# Patient Record
Sex: MALE | URBAN - METROPOLITAN AREA
[De-identification: names, ages, dates, MRNs, and addresses within clinical notes are randomized per-mention and may not be internally consistent; named-entity substitution may affect disease eponyms.]

---

## 2018-06-19 ENCOUNTER — RECORDS - HEALTHEAST (OUTPATIENT)
Dept: LAB | Facility: CLINIC | Age: 33
End: 2018-06-19

## 2018-06-19 LAB — HIV 1+2 AB+HIV1 P24 AG SERPL QL IA: NEGATIVE

## 2018-06-20 LAB
HBV SURFACE AG SERPL QL IA: NEGATIVE
HCV AB SERPL QL IA: NEGATIVE
HEPATITIS B SURFACE ANTIBODY LHE- HISTORICAL: NEGATIVE

## 2019-09-18 ENCOUNTER — THERAPY VISIT (OUTPATIENT)
Dept: PHYSICAL THERAPY | Facility: CLINIC | Age: 34
End: 2019-09-18
Payer: OTHER MISCELLANEOUS

## 2019-09-18 DIAGNOSIS — M54.50 ACUTE LEFT-SIDED LOW BACK PAIN WITHOUT SCIATICA: ICD-10-CM

## 2019-09-18 PROCEDURE — 97161 PT EVAL LOW COMPLEX 20 MIN: CPT | Mod: GP | Performed by: PHYSICAL THERAPIST

## 2019-09-18 PROCEDURE — 97110 THERAPEUTIC EXERCISES: CPT | Mod: GP | Performed by: PHYSICAL THERAPIST

## 2019-09-18 NOTE — PROGRESS NOTES
"[facility] Initial Evaluation  HPI  SUBJECTIVE  Ronny Head is a 34 year old male patient presenting to Physical Therapy with the following Primary Symptoms: Left low back pain. He admits to having related symptoms spreading to the left oblique area, left thoracic paraspinals, and left lumbogluteal junction. These pains are described as constant. He denies having painful cough/sneeze, saddle anaesthesia, severe night pain, peripheral motor deficit, recent bowel/bladder change. Pain is rated 6/10 at rest, and 8/10 at worst. Patient describes pain as aching and pressure.  Patient notes that he felt the left low back muscles are \"swollen\" or appear larger than the muscles on the right. History of symptoms: These pains began suddenly September / 02 / 2019 as the result of an awkward one handed pull and lift. Pain was immediate but was actually worse the next morning upon waking up. Previous treatment has included rest and self-stretching. Patient notes that prior treatment has resulted in no change in symptoms. Since onset, these pains are variable : Current Symptoms are worsened by: bending, lifting, twisting. Current Symptoms are relieved by rest and occasionally stretching. Patient states that current complaints are affecting sleep pattern, 1-2 hours of sleep lost on average. Recent surgical procedure: none. Recent imaging studies have not been performed. General health as reported by patient is: good. Pertinent medical history: none. He denies any significant current illness or recent hospital admissions. He denies any regonal implanted devices. Current occupational status: . 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 Patient has continued working without restrictions until 9/16 when the pain peaked. He has had to take several days off since onset of injury. Since 9/16 he has been working light " duty. Previous functional status: fully functional prior to pain onset/injury.      PATIENT GOALS: Return to working full duty as soon as possible, as well as to receive a program to help self-manage symptoms      OBJECTIVE    STATIC POSTURE: Patient is seated with upright posture, notably avoiding flexion/extension/rotation during subjective interview.  -This patient was not able to remain comfortably seated throughout exam.      MOTOR CONTROL:  -Pelvic tilting: Good, with lumbar paraspinal stretch sensation felt, painfree    ROM:  Flexion Distal shin*   Extension 25% limited, relieves pain   Sidebend left Knees*   Sidebend right Knees   Rotation left WNL*   Rotation right WNL   *Denotes pain        LE NEURO SCREEN    Myotomes:   Left Right   Hip flexion (L2) 4+/5* 4+/5   Knee extension (L3) 5/5 5/5   Ankle dorsiflexion (L4) 5/5 5/5   Great toe extension (L5) 5/5 5/5   Ankle Plantarflexion (S1)         Reflexes: Patellar reflex 2+ bilaterally      SEGMENTAL MOBILITY:  -PA spring test: Patient was Hypomobile through segments L1-L2 through L5-S1. Increasing reproduction of symptoms felt with PA pressure through more distal segments.    PALPATION: Noted hypertrophy of L lumbar paraspinals with occasional fasciculation during exam. Tenderess noted through these paraspinals, at the L PSIS, L QL.      HIP SCREEN:  -ROM: Hypomobile into flexion bilaterally, ERP noted only with overpressure into L hip flexion. IR/ER WNL.      Lumbar Special Tests   Left Right   Ronny test/Modified Ronny test Pos for pain, no limitation NEg   Straight Leg Raise Pos @ 45 dgs Neg   Active Straight Leg Raise Pos @ 45 dgs Neg   Hamstring 90-90 Neg Neg   Piriformis Test Neg Neg   Long Axis Distraction     FADIR Neg Neg   SPIKE Neg Neg   Posterior Capsule Compression Neg Neg   Slump Test Neg Pos for L LBP   NT=Not Tested      REPEATED MOTIONS:  Motion Response (worse/no worse/better)   FIS    FIS x10    EIS Better   EIS x10 Better   RICKY    RICKY  x10    Prone lying No change   Prone on elbows Better   Prone press-up Better   Prone press-up x10 Better       Therapist Assessment: Ronny Head is a 34 year old male patient presenting to Physical Therapy with L LBP consistent with hypertonic muscles and myofascial restrictions limiting function with work tasks. Patient demonstrates loss of lumbar ROM, increased co-activation of spinal extensors, both leading to limitations with work tasks precipitating light duty work at this time.  Skilled PT services are necessary in order to reduce impairments and improve independent function.                      System    Physical Exam    General     ROS    Assessment/Plan:    Patient is a 34 year old male with lumbar complaints.    Patient has the following significant findings with corresponding treatment plan.                Diagnosis 1:  Lumbar strain  Pain -  hot/cold therapy, manual therapy, self management, education and home program  Decreased ROM/flexibility - manual therapy, therapeutic exercise and home program  Decreased joint mobility - manual therapy, therapeutic exercise and home program  Impaired muscle performance - neuro re-education and home program  Decreased function - therapeutic activities and home program    Therapy Evaluation Codes:   Cumulative Therapy Evaluation is: Low complexity.    Previous and current functional limitations:  (See Goal Flow Sheet for this information)    Short term and Long term goals: (See Goal Flow Sheet for this information)     Communication ability:  Patient appears to be able to clearly communicate and understand verbal and written communication and follow directions correctly.  Treatment Explanation - The following has been discussed with the patient:   RX ordered/plan of care  Anticipated outcomes  Possible risks and side effects  This patient would benefit from PT intervention to resume normal activities.   Rehab potential is good.    Frequency:  2 X week, once  daily  Duration:  for 2 weeks tapering to 1 X a week over 4 weeks  Discharge Plan:  Achieve all LTG.  Independent in home treatment program.  Reach maximal therapeutic benefit.    Please refer to the daily flowsheet for treatment today, total treatment time and time spent performing 1:1 timed codes.

## 2019-09-18 NOTE — LETTER
"JIMENEZ MEJIASINE [facility]  [address]  [address]  [place] [address]  [phone]    2019    Re: Ronny Head   :   1985  MRN:  [id number]   REFERRING PHYSICIAN:   Bronson MEDINAM [place] [facility]    Date of Initial Evaluation:   Visits:  Rxs Used: 1  Reason for Referral:  Acute left-sided low back pain without sciatica    EVALUATION SUMMARY    [place] [facility] Initial Evaluation  HPI  SUBJECTIVE  Ronny Head is a 34 year old male patient presenting to Physical Therapy with the following Primary Symptoms: Left low back pain. He admits to having related symptoms spreading to the left oblique area, left thoracic paraspinals, and left lumbogluteal junction. These pains are described as constant. He denies having painful cough/sneeze, saddle anaesthesia, severe night pain, peripheral motor deficit, recent bowel/bladder change. Pain is rated 6/10 at rest, and 8/10 at worst. Patient describes pain as aching and pressure.  Patient notes that he felt the left low back muscles are \"swollen\" or appear larger than the muscles on the right. History of symptoms: These pains began suddenly  as the result of an awkward one handed pull and lift. Pain was immediate but was actually worse the next morning upon waking up. Previous treatment has included rest and self-stretching. Patient notes that prior treatment has resulted in no change in symptoms. Since onset, these pains are variable : Current Symptoms are worsened by: bending, lifting, twisting. Current Symptoms are relieved by rest and occasionally stretching. Patient states that current complaints are affecting sleep pattern, 1-2 hours of sleep lost on average. Recent surgical procedure: none. Recent imaging studies have not been performed. General health as reported by patient is: good. Pertinent medical history: none. He denies any significant current illness or recent hospital admissions. He denies any " regonal implanted devices. Current occupational status: . 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 Patient has continued working without restrictions until  when the pain peaked. He has had to take several days off since onset of injury. Since  he has been working light duty. Previous functional status: fully functional prior to pain onset/injury.      PATIENT GOALS: Return to working full duty as soon as possible, as well as to receive a program to help self-manage symptoms      OBJECTIVE  Ronny Head   :   1985        STATIC POSTURE: Patient is seated with upright posture, notably avoiding flexion/extension/rotation during subjective interview.  -This patient was not able to remain comfortably seated throughout exam.      MOTOR CONTROL:  -Pelvic tilting: Good, with lumbar paraspinal stretch sensation felt, painfree    ROM:  Flexion Distal shin*   Extension 25% limited, relieves pain   Sidebend left Knees*   Sidebend right Knees   Rotation left WNL*   Rotation right WNL   *Denotes pain        LE NEURO SCREEN    Myotomes:   Left Right   Hip flexion (L2) 4+/5* 4+/5   Knee extension (L3) 5/5 5/5   Ankle dorsiflexion (L4) 5/5 5/5   Great toe extension (L5) 5/5 5/5   Ankle Plantarflexion (S1)         Reflexes: Patellar reflex 2+ bilaterally      SEGMENTAL MOBILITY:  -PA spring test: Patient was Hypomobile through segments L1-L2 through L5-S1. Increasing reproduction of symptoms felt with PA pressure through more distal segments.    PALPATION: Noted hypertrophy of L lumbar paraspinals with occasional fasciculation during exam. Tenderess noted through these paraspinals, at the L PSIS, L QL.      HIP SCREEN:  -ROM: Hypomobile into flexion bilaterally, ERP noted only with overpressure into L hip flexion. IR/ER WNL.        Ronny    :   1985      Lumbar Special Tests   Left Right   Ronny  test/Modified Ronny test Pos for pain, no limitation NEg   Straight Leg Raise Pos @ 45 dgs Neg   Active Straight Leg Raise Pos @ 45 dgs Neg   Hamstring 90-90 Neg Neg   Piriformis Test Neg Neg   Long Axis Distraction     FADIR Neg Neg   SPIKE Neg Neg   Posterior Capsule Compression Neg Neg   Slump Test Neg Pos for L LBP   NT=Not Tested      REPEATED MOTIONS:  Motion Response (worse/no worse/better)   FIS    FIS x10    EIS Better   EIS x10 Better   RICKY    RICKY x10    Prone lying No change   Prone on elbows Better   Prone press-up Better   Prone press-up x10 Better       Therapist Assessment: Ronny Head is a 34 year old male patient presenting to Physical Therapy with L LBP consistent with hypertonic muscles and myofascial restrictions limiting function with work tasks. Patient demonstrates loss of lumbar ROM, increased co-activation of spinal extensors, both leading to limitations with work tasks precipitating light duty work at this time.  Skilled PT services are necessary in order to reduce impairments and improve independent function.                      Assessment/Plan:    Patient is a 34 year old male with lumbar complaints.    Patient has the following significant findings with corresponding treatment plan.                Diagnosis 1:  Lumbar strain  Pain -  hot/cold therapy, manual therapy, self management, education and home program  Decreased ROM/flexibility - manual therapy, therapeutic exercise and home program  Decreased joint mobility - manual therapy, therapeutic exercise and home program  Impaired muscle performance - neuro re-education and home program  Decreased function - therapeutic activities and home program  Ronny eHad   :   1985          Previous and current functional limitations:  (See Goal Flow Sheet for this information)    Short term and Long term goals: (See Goal Flow Sheet for this information)     Communication ability:  Patient appears to be able to clearly  communicate and understand verbal and written communication and follow directions correctly.  Treatment Explanation - The following has been discussed with the patient:   RX ordered/plan of care  Anticipated outcomes  Possible risks and side effects  This patient would benefit from PT intervention to resume normal activities.   Rehab potential is good.    Frequency:  2 X week, once daily  Duration:  for 2 weeks tapering to 1 X a week over 4 weeks  Discharge Plan:  Achieve all LTG.  Independent in home treatment program.  Reach maximal therapeutic benefit.          Thank you for your referral.    INQUIRIES  Therapist: Floyd Hines, HELGA DOMINGUEZ [facility]  [address]  [place] [address]  Phone: [phone]  Fax: [phone]

## 2019-09-20 ENCOUNTER — THERAPY VISIT (OUTPATIENT)
Dept: PHYSICAL THERAPY | Facility: CLINIC | Age: 34
End: 2019-09-20
Payer: OTHER MISCELLANEOUS

## 2019-09-20 DIAGNOSIS — M54.50 ACUTE LEFT-SIDED LOW BACK PAIN WITHOUT SCIATICA: ICD-10-CM

## 2019-09-20 PROCEDURE — 97110 THERAPEUTIC EXERCISES: CPT | Mod: GP | Performed by: PHYSICAL THERAPIST

## 2019-09-20 PROCEDURE — 97140 MANUAL THERAPY 1/> REGIONS: CPT | Mod: GP | Performed by: PHYSICAL THERAPIST

## 2019-09-20 PROCEDURE — 97530 THERAPEUTIC ACTIVITIES: CPT | Mod: GP | Performed by: PHYSICAL THERAPIST

## 2019-09-23 ENCOUNTER — THERAPY VISIT (OUTPATIENT)
Dept: PHYSICAL THERAPY | Facility: CLINIC | Age: 34
End: 2019-09-23
Payer: OTHER MISCELLANEOUS

## 2019-09-23 DIAGNOSIS — M54.50 ACUTE LEFT-SIDED LOW BACK PAIN WITHOUT SCIATICA: ICD-10-CM

## 2019-09-23 PROCEDURE — 97110 THERAPEUTIC EXERCISES: CPT | Mod: GP | Performed by: PHYSICAL THERAPIST

## 2019-09-23 PROCEDURE — 97140 MANUAL THERAPY 1/> REGIONS: CPT | Mod: GP | Performed by: PHYSICAL THERAPIST

## 2019-09-25 ENCOUNTER — THERAPY VISIT (OUTPATIENT)
Dept: PHYSICAL THERAPY | Facility: CLINIC | Age: 34
End: 2019-09-25
Payer: OTHER MISCELLANEOUS

## 2019-09-25 DIAGNOSIS — M54.50 ACUTE LEFT-SIDED LOW BACK PAIN WITHOUT SCIATICA: ICD-10-CM

## 2019-09-25 PROCEDURE — 97140 MANUAL THERAPY 1/> REGIONS: CPT | Mod: GP | Performed by: PHYSICAL THERAPIST

## 2019-09-25 PROCEDURE — 97110 THERAPEUTIC EXERCISES: CPT | Mod: GP | Performed by: PHYSICAL THERAPIST

## 2019-09-30 ENCOUNTER — THERAPY VISIT (OUTPATIENT)
Dept: PHYSICAL THERAPY | Facility: CLINIC | Age: 34
End: 2019-09-30
Payer: OTHER MISCELLANEOUS

## 2019-09-30 DIAGNOSIS — M54.50 ACUTE LEFT-SIDED LOW BACK PAIN WITHOUT SCIATICA: ICD-10-CM

## 2019-09-30 PROCEDURE — 97110 THERAPEUTIC EXERCISES: CPT | Mod: GP | Performed by: PHYSICAL THERAPIST

## 2019-10-10 ENCOUNTER — THERAPY VISIT (OUTPATIENT)
Dept: PHYSICAL THERAPY | Facility: CLINIC | Age: 34
End: 2019-10-10
Payer: OTHER MISCELLANEOUS

## 2019-10-10 DIAGNOSIS — M54.50 ACUTE LEFT-SIDED LOW BACK PAIN WITHOUT SCIATICA: ICD-10-CM

## 2019-10-10 PROCEDURE — 97110 THERAPEUTIC EXERCISES: CPT | Mod: GP | Performed by: PHYSICAL THERAPIST

## 2019-10-14 ENCOUNTER — THERAPY VISIT (OUTPATIENT)
Dept: PHYSICAL THERAPY | Facility: CLINIC | Age: 34
End: 2019-10-14
Payer: OTHER MISCELLANEOUS

## 2019-10-14 DIAGNOSIS — M54.50 ACUTE LEFT-SIDED LOW BACK PAIN WITHOUT SCIATICA: ICD-10-CM

## 2019-10-14 PROCEDURE — 97110 THERAPEUTIC EXERCISES: CPT | Mod: GP | Performed by: PHYSICAL THERAPIST

## 2019-10-14 NOTE — PROGRESS NOTES
HPI                 System    Physical Exam    General     ROS    Assessment/Plan:    PROGRESS  REPORT    Progress reporting period is from 9/18/19 to 10/14/19.       SUBJECTIVE  Subjective: Patient has returned to the gym with light lifting exercises, which is tolerable. Most lifting exercises >25# bother it, but he feels that the load tolerance has improved overall since beginning PT. He has modified his driving and sitting postures, which has actually helped quite a bit over the past weekend. He has continued to be dismissed from work activities. Overall, things are improving, and patient believes that he is 60-70% normal. He sees the occupational health MD tomorrow and is planning to request more PT visits.      Current Pain level: 0/10 at rest, 5/10 baseline with active exercise.     Initial Pain level: 8/10 with exercise, 6/10 at rest.     Changes in function:  Yes, improved load tolerance to lifting activity, improved lumbar ROM, improved body mechanics with neutral spine exercise, improved self-management strategies.  Adverse reaction to treatment or activity: activity - heavy lifting, lifting with rotation especially to the left.    OBJECTIVE  Changes noted in objective findings:  Yes, improved lumbar ROM in sagittal plane, improved load tolerance with lifting activities simulating work activity, improved body mechanics with abdominal exercises, improved self-management of sleeping and driving postures, improved fear avoidance of activity.    Objective: Pre-session: Flexion to floor with ERP at ankle level, extension full with slight ERP, rotation full with discomfort at end range L rotation.  Post-Session: Flexion to floor with no change in end range pain at ankle level, rotation and extension ROM unchanged with no change in ERP. Body mechanics throughout all neutral spine exercise for isometric rotation with good form. Some discomfort from increased loading today felt after session with no effect on  gait or ADL-level function immediately following session.    ASSESSMENT/PLAN  STG/LTGs have been met or progress has been made towards goals:  Yes, lifting load tolerance has progressed from 10# x10 with 8/10 pain to 20# x10 today with 6/10 pain in fully fatigued state, with improved body mechanics and self-correction of form.  Assessment of Progress: The patient's condition is improving.  The patient's condition has potential to improve.  Self Management Plans:  Patient has been instructed in a home treatment program.  I have re-evaluated this patient and find that the nature, scope, duration and intensity of the therapy is appropriate for the medical condition of the patient.  Ronny continues to require the following intervention to meet STG and LTG's:  PT    Recommendations:  This patient would benefit from continued therapy to continue with progress toward short term goals and long term goals in order to return to work painfree and without restrictions.     Frequency:  1 X week, once daily  Duration:  for 5 weeks        Please refer to the daily flowsheet for treatment today, total treatment time and time spent performing 1:1 timed codes.

## 2019-10-14 NOTE — LETTER
JIMENEZ DOMINGUEZ [facility]  [address]  [address]  [place] [address]  [phone]    2019    Re: Ronny Head   :   1985  MRN:  [id number]   REFERRING PHYSICIAN:   Bronson Landers    JIMENEZ DOMINGUEZ [facility]    Date of Initial Evaluation: 19  Visits:  Rxs Used: 7  Reason for Referral:  Acute left-sided low back pain without sciatica      PROGRESS  REPORT    Progress reporting period is from 19 to 10/14/19.       SUBJECTIVE  Subjective: Patient has returned to the gym with light lifting exercises, which is tolerable. Most lifting exercises >25# bother it, but he feels that the load tolerance has improved overall since beginning PT. He has modified his driving and sitting postures, which has actually helped quite a bit over the past weekend. He has continued to be dismissed from work activities. Overall, things are improving, and patient believes that he is 60-70% normal. He sees the occupational health MD tomorrow and is planning to request more PT visits.      Current Pain level: 0/10 at rest, 5/10 baseline with active exercise.     Initial Pain level: 8/10 with exercise, 6/10 at rest.     Changes in function:  Yes, improved load tolerance to lifting activity, improved lumbar ROM, improved body mechanics with neutral spine exercise, improved self-management strategies.  Adverse reaction to treatment or activity: activity - heavy lifting, lifting with rotation especially to the left.    OBJECTIVE  Changes noted in objective findings:  Yes, improved lumbar ROM in sagittal plane, improved load tolerance with lifting activities simulating work activity, improved body mechanics with abdominal exercises, improved self-management of sleeping and driving postures, improved fear avoidance of activity.    Objective: Pre-session: Flexion to floor with ERP at ankle level, extension full with slight ERP, rotation full with discomfort at end range L rotation.  Post-Session: Flexion to floor with no  change in end range pain at ankle level, rotation and extension ROM unchanged with no change in ERP. Body mechanics throughout all neutral spine exercise for isometric rotation with good form. Some discomfort from increased loading today felt after session with no effect on gait or ADL-level function immediately following session.    Ronny Head   :   1985        ASSESSMENT/PLAN  STG/LTGs have been met or progress has been made towards goals:  Yes, lifting load tolerance has progressed from 10# x10 with 8/10 pain to 20# x10 today with 6/10 pain in fully fatigued state, with improved body mechanics and self-correction of form.  Assessment of Progress: The patient's condition is improving.  The patient's condition has potential to improve.  Self Management Plans:  Patient has been instructed in a home treatment program.  I have re-evaluated this patient and find that the nature, scope, duration and intensity of the therapy is appropriate for the medical condition of the patient.  Ronny continues to require the following intervention to meet STG and LTG's:  PT    Recommendations:  This patient would benefit from continued therapy to continue with progress toward short term goals and long term goals in order to return to work painfree and without restrictions.     Frequency:  1 X week, once daily  Duration:  for 5 weeks                    Thank you for your referral.    INQUIRIES  Therapist: Floyd Hines, PT  JIMENEZ DOMINGUEZ [facility]  [address]  [place] [address]  Phone: [phone]  Fax: [phone]

## 2019-10-16 ENCOUNTER — THERAPY VISIT (OUTPATIENT)
Dept: PHYSICAL THERAPY | Facility: CLINIC | Age: 34
End: 2019-10-16
Payer: OTHER MISCELLANEOUS

## 2019-10-16 DIAGNOSIS — M54.50 ACUTE LEFT-SIDED LOW BACK PAIN WITHOUT SCIATICA: ICD-10-CM

## 2019-10-16 PROCEDURE — 97110 THERAPEUTIC EXERCISES: CPT | Mod: GP | Performed by: PHYSICAL THERAPIST

## 2019-10-16 PROCEDURE — 97530 THERAPEUTIC ACTIVITIES: CPT | Mod: GP | Performed by: PHYSICAL THERAPIST

## 2019-10-21 ENCOUNTER — THERAPY VISIT (OUTPATIENT)
Dept: PHYSICAL THERAPY | Facility: CLINIC | Age: 34
End: 2019-10-21
Payer: OTHER MISCELLANEOUS

## 2019-10-21 DIAGNOSIS — M54.50 ACUTE LEFT-SIDED LOW BACK PAIN WITHOUT SCIATICA: ICD-10-CM

## 2019-10-21 PROCEDURE — 97110 THERAPEUTIC EXERCISES: CPT | Mod: GP | Performed by: PHYSICAL THERAPIST

## 2019-10-23 ENCOUNTER — THERAPY VISIT (OUTPATIENT)
Dept: PHYSICAL THERAPY | Facility: CLINIC | Age: 34
End: 2019-10-23
Payer: OTHER MISCELLANEOUS

## 2019-10-23 DIAGNOSIS — M54.50 ACUTE LEFT-SIDED LOW BACK PAIN WITHOUT SCIATICA: ICD-10-CM

## 2019-10-23 PROCEDURE — 97012 MECHANICAL TRACTION THERAPY: CPT | Mod: GP | Performed by: PHYSICAL THERAPIST

## 2019-10-23 PROCEDURE — 97110 THERAPEUTIC EXERCISES: CPT | Mod: GP | Performed by: PHYSICAL THERAPIST

## 2019-10-28 ENCOUNTER — THERAPY VISIT (OUTPATIENT)
Dept: PHYSICAL THERAPY | Facility: CLINIC | Age: 34
End: 2019-10-28
Payer: OTHER MISCELLANEOUS

## 2019-10-28 DIAGNOSIS — M54.50 ACUTE LEFT-SIDED LOW BACK PAIN WITHOUT SCIATICA: ICD-10-CM

## 2019-10-28 PROCEDURE — 97530 THERAPEUTIC ACTIVITIES: CPT | Mod: GP | Performed by: PHYSICAL THERAPIST

## 2019-10-28 PROCEDURE — 97110 THERAPEUTIC EXERCISES: CPT | Mod: GP | Performed by: PHYSICAL THERAPIST

## 2019-10-28 PROCEDURE — 97012 MECHANICAL TRACTION THERAPY: CPT | Mod: GP | Performed by: PHYSICAL THERAPIST

## 2019-12-23 PROBLEM — M54.50 ACUTE LEFT-SIDED LOW BACK PAIN WITHOUT SCIATICA: Status: RESOLVED | Noted: 2019-09-18 | Resolved: 2019-12-23

## 2019-12-23 NOTE — PROGRESS NOTES
12/23/19:    Patient has failed to follow up with clinic within 30 days without communication. Current functional status is unknown at this time. Please see most recent note dated 10/28/19 for most recent functional status. Patient is to be discharged from formal skilled PT at this time.

## 2020-06-29 ENCOUNTER — THERAPY VISIT (OUTPATIENT)
Dept: PHYSICAL THERAPY | Facility: CLINIC | Age: 35
End: 2020-06-29
Payer: OTHER MISCELLANEOUS

## 2020-06-29 DIAGNOSIS — G89.29 CHRONIC BILATERAL LOW BACK PAIN WITH LEFT-SIDED SCIATICA: Primary | ICD-10-CM

## 2020-06-29 DIAGNOSIS — M54.42 CHRONIC BILATERAL LOW BACK PAIN WITH LEFT-SIDED SCIATICA: Primary | ICD-10-CM

## 2020-06-29 PROCEDURE — 97161 PT EVAL LOW COMPLEX 20 MIN: CPT | Mod: GP | Performed by: PHYSICAL THERAPIST

## 2020-06-29 PROCEDURE — 97110 THERAPEUTIC EXERCISES: CPT | Mod: GP | Performed by: PHYSICAL THERAPIST

## 2020-06-29 PROCEDURE — 97012 MECHANICAL TRACTION THERAPY: CPT | Mod: GP | Performed by: PHYSICAL THERAPIST

## 2020-06-29 NOTE — LETTER
JIMENEZ MEJIASINE [facility]  [address]  [place] [address]  [phone]    July 3, 2020    Re: Ronny Head   :   1985  MRN:  [id number]   REFERRING PHYSICIAN:   Scott DOMINGUEZ [facility]    Date of Initial Evaluation:  2020  Visits:  Rxs Used: 1  Reason for Referral:  Chronic bilateral low back pain with left-sided sciatica    EVALUATION SUMMARY    [facility] Initial Evaluation  Subjective:  The history is provided by the patient. No  was used.   Therapist Generated HPI Evaluation  Problem details: 10 months post low back injury at work while lifting. Low back and referred sx's to the L foot     Cortisone injection in 2020 without relief.     Radial Nerve Block in May 2020, offered pain relief    Has not RTW for [employer], but does work as a  at gym.     Takes oral Oxycodone, Tramidol, and Lyrica to assist with sleeping.     RAMYA set for evaluation    Patient goal is restore 100% pre-injury status with weight lifting and self care/life activity.     He has had multiple PT, chiro care.   .         Type of problem:  Lumbar.    This is a chronic condition.  Condition occurred with:  Lifting.  Where condition occurred: at work.  Patient reports pain:  Lumbar spine right.  Pain is described as aching and is intermittent (5/10 ).  Pain radiates to:  Thigh left, lower leg left and foot left. Pain is worse during the night.  Since onset symptoms are unchanged.  Associated symptoms:  Loss of motion, loss of strength and tingling. Symptoms are exacerbated by bending, carrying, lifting, sitting, standing and twisting  Relieved by: injection therapy and nerve block.   Special tests included:  MRI.  Previous treatment includes physical therapy. There was mild improvement following   Ronny Head   :   1985    previous treatment.  Restrictions due to condition include:  Currently not working due to present treatment.  Barriers  include:  None as reported by patient.    Oswestry Score: 22 %                 Objective:  Standing Alignment:    Cervical/Thoracic:  Forward head  Shoulder/UE:  Rounded shoulders  Lumbar:  Normal    Gait:    Gait Type:  Normal   Weight Bearing Status:  WBAT   Assistive Devices:  None  Neurological: He is alert. He has normal strength and normal reflexes. No cranial nerve deficit or sensory deficit.   Physical Exam      [facility] Lumbar Evaluation    Posture:  Sitting: fair  Standing: good  Lordosis: Reduced  Lateral Shift: no  Correction of Posture: better    Movement Loss:  Flexion (Flex): min and pain  Extension (EXT): mod and pain  Side Mozier R (SG R): nil  Side Glide L (SG L): nil    Test Movements:  FIS: During: produces and no effect  After: no effect  Mechanical Response: no effect  Repeat FIS: During: produces  After: no effect  Mechanical Response: no effect  EIS: During: decreases  After: no better  Mechanical Response: IncROM  Repeat EIS: During: decreases  After: no better  Mechanical Response: IncROM    Static Tests:  Sitting Slouched: increase, worse sx's   Sitting Erect: better , no abolished   Lying Prone in Extension: no effect , press up position reduces     Conclusion: derangement  Principle of Treatment:  Posture Correction: modified posture education   Extension: initiate progressive extension  Other: use of modalities such as mechanical traction for pain relief up to 2-3 visit  VICTORIA Head   :   1985    Assessment/Plan:    Patient is a 35 year old male with lumbar complaints.    Patient has the following significant findings with corresponding treatment plan.                Diagnosis 1:  Mechanical Low Back pain       Therapy Evaluation Codes:   1) History comprised of:   Personal factors that impact the plan of care:      None.    Comorbidity factors that impact the plan of care are:      None.     Medications impacting care: Pain, Sleep and Narcs.  2) Examination of Body  Systems comprised of:   Body structures and functions that impact the plan of care:      Lumbar spine.   Activity limitations that impact the plan of care are:      Bending, Driving, Dressing, Lifting, Squatting/kneeling, Working and Sleeping.  3) Clinical presentation characteristics are:   Stable/Uncomplicated.  4) Decision-Making    Low complexity using standardized patient assessment instrument and/or measureable assessment of functional outcome.  Cumulative Therapy Evaluation is: Low complexity.  Previous and current functional limitations:  (See Goal Flow Sheet for this information)    Short term and Long term goals: (See Goal Flow Sheet for this information)   Communication ability:  Patient appears to be able to clearly communicate and understand verbal and written communication and follow directions correctly.  Treatment Explanation - The following has been discussed with the patient:   RX ordered/plan of care  Anticipated outcomes  Possible risks and side effects  This patient would benefit from PT intervention to resume normal activities.   Rehab potential is fair.  Frequency:  1-2 X week, once daily  Duration:  for 4 weeks  Discharge Plan:  Achieve all LTG.  Independent in home treatment program.  Reach maximal therapeutic benefit.  Rehab Plans: modalities prn, advance lumbar extension principles and possible manual therapy     Thank you for your referral.    INQUIRIES  Therapist: Gustavo Guo, PT, ATC, Cert MDT  JIMENEZ DOMINGUEZ [facility] [place] [facility]  [address]  [place] [address]  Phone: [phone]  Fax: [phone]

## 2020-07-03 NOTE — PROGRESS NOTES
[facility] Initial Evaluation  Subjective:  The history is provided by the patient. No  was used.   Therapist Generated HPI Evaluation  Problem details: 10 months post low back injury at work while lifting. Low back and referred sx's to the L foot     Cortisone injection in February 2020 without relief.     Radial Nerve Block in May 2020, offered pain relief    Has not RTW for [employer], but does work as a  at gym.     Takes oral Oxycodone, Tramidol, and Lyrica to assist with sleeping.     RAMYA set for evaluation    Patient goal is restore 100% pre-injury status with weight lifting and self care/life activity.     He has had multiple PT, chiro care.       .         Type of problem:  Lumbar.    This is a chronic condition.  Condition occurred with:  Lifting.  Where condition occurred: at work.  Patient reports pain:  Lumbar spine right.  Pain is described as aching and is intermittent (5/10 ).  Pain radiates to:  Thigh left, lower leg left and foot left. Pain is worse during the night.  Since onset symptoms are unchanged.  Associated symptoms:  Loss of motion, loss of strength and tingling. Symptoms are exacerbated by bending, carrying, lifting, sitting, standing and twisting  Relieved by: injection therapy and nerve block.   Special tests included:  MRI.  Previous treatment includes physical therapy. There was mild improvement following previous treatment.  Restrictions due to condition include:  Currently not working due to present treatment.  Barriers include:  None as reported by patient.      Oswestry Score: 22 %                 Objective:  Standing Alignment:    Cervical/Thoracic:  Forward head  Shoulder/UE:  Rounded shoulders  Lumbar:  Normal            Gait:    Gait Type:  Normal   Weight Bearing Status:  WBAT   Assistive Devices:  None        Neurological: He is alert. He has normal strength and normal reflexes. No cranial nerve deficit or sensory  deficit.       Physical Exam      [facility] Lumbar Evaluation    Posture:  Sitting: fair  Standing: good  Lordosis: Reduced  Lateral Shift: no  Correction of Posture: better    Movement Loss:  Flexion (Flex): min and pain  Extension (EXT): mod and pain  Side Byram R (SG R): nil  Side Glide L (SG L): nil  Test Movements:  FIS: During: produces and no effect  After: no effect  Mechanical Response: no effect  Repeat FIS: During: produces  After: no effect  Mechanical Response: no effect  EIS: During: decreases  After: no better  Mechanical Response: IncROM  Repeat EIS: During: decreases  After: no better  Mechanical Response: IncROM          Static Tests:  Sitting Slouched: increase, worse sx's   Sitting Erect: better , no abolished       Lying Prone in Extension: no effect , press up position reduces     Conclusion: derangement  Principle of Treatment:  Posture Correction: modified posture education     Extension: initiate progressive extension    Other: use of modalities such as mechanical traction for pain relief up to 2-3 visit                                       ROS    Assessment/Plan:    Patient is a 35 year old male with lumbar complaints.    Patient has the following significant findings with corresponding treatment plan.                Diagnosis 1:  Mechanical Low Back pain       Therapy Evaluation Codes:   1) History comprised of:   Personal factors that impact the plan of care:      None.    Comorbidity factors that impact the plan of care are:      None.     Medications impacting care: Pain, Sleep and Narcs.  2) Examination of Body Systems comprised of:   Body structures and functions that impact the plan of care:      Lumbar spine.   Activity limitations that impact the plan of care are:      Bending, Driving, Dressing, Lifting, Squatting/kneeling, Working and Sleeping.  3) Clinical presentation characteristics are:   Stable/Uncomplicated.  4) Decision-Making    Low complexity using standardized patient  assessment instrument and/or measureable assessment of functional outcome.  Cumulative Therapy Evaluation is: Low complexity.    Previous and current functional limitations:  (See Goal Flow Sheet for this information)    Short term and Long term goals: (See Goal Flow Sheet for this information)     Communication ability:  Patient appears to be able to clearly communicate and understand verbal and written communication and follow directions correctly.  Treatment Explanation - The following has been discussed with the patient:   RX ordered/plan of care  Anticipated outcomes  Possible risks and side effects  This patient would benefit from PT intervention to resume normal activities.   Rehab potential is fair.    Frequency:  1-2 X week, once daily  Duration:  for 4 weeks  Discharge Plan:  Achieve all LTG.  Independent in home treatment program.  Reach maximal therapeutic benefit.    Rehab Plans: modalities prn, advance lumbar extension principles and possible manual therapy     Please refer to the daily flowsheet for treatment today, total treatment time and time spent performing 1:1 timed codes.